# Patient Record
(demographics unavailable — no encounter records)

---

## 2018-12-29 NOTE — ERD
ER Documentation


Chief Complaint


Chief Complaint





CHEST WALL PAIN DUE TO MECHANICAL FALL 1 WEEK AGO





HPI


57-year-old male, with history of DM, coronary artery disease and hypertension, 


presents to the emergency department, complaining of left anterior chest wall 


pain after a mechanical fall that occurred approximately 1 week ago.  The 


patient states that the pain is sharp, 6/10, worsened by cough, palpation and 


deep inspiration.  The patient denies shortness of breath, no cough, no 


dizziness.





ROS


All systems reviewed and are negative except as per history of present illness.





Medications


Home Meds


Active Scripts


Hydrocodone/Acetaminophen (Norco 5-325 Tablet) 1 Each Tablet, 1 TAB PO Q6H PRN 


for PAIN, #7 TAB


   Prov:LUPE HUFF MD         18


Acetaminophen* (Tylenol*) 325 Mg Tablet, 2 TAB PO Q8 PRN for PAIN AND OR 


ELEVATED TEMP, #20 TAB


   Prov:LUPE HUFF MD         18


Cyclobenzaprine Hcl* (Cyclobenzaprine Hcl*) 10 Mg Tablet, 10 MG PO Q8 PRN for 


MUSCLE SPASMS, #20 TAB


   Prov:BILLY LANDON PA-C         3/19/16


Diclofenac Sodium* (Diclofenac Sodium*) 50 Mg Tablet.dr, 50 MG PO BID, #60 TAB


   Prov:BILLY LANDON PA-C         3/19/16


Naproxen* (Naprosyn*) 500 Mg Tablet, 500 MG PO BID PRN for PAIN AND/OR 


INFLAMMATION, #30 TAB


   Prov:LUIS ANGEL LOYD NP         6/24/15


Cyclobenzaprine Hcl* (Cyclobenzaprine Hcl*) 10 Mg Tablet, 5 MG PO TID, #30 TAB


   Prov:LUIS ANGEL LOYD NP         6/24/15


Reported Medications


Glucosamine Sulfate 2KCL (GLUCOSAMINE) 1,000 Mg Tablet, 1000 MG PO DAILY


   13


Multivitamin* (Daily Value*) 1 Each Tablet, 1 EACH PO DAILY


   13


Aspirin (Baby Aspirin) 81 Mg Tab.chew, 81 MG PO DAILY


   13


[Bp Med]   No Conflict Check


   13


Lovastatin (Lovastatin) 40 Mg Tablet, 40 MG PO DAILY


   13


Acetaminophen (Tylenol) 500 Mg Tab


   2/25/10


D-Methorphan Hb/Pe/Chlorphenir (Jacquie-Dm Syrup) 473 Ml Syrup


   2/25/10


Pioglitazone Hcl* (Actos*) 30 Mg Tablet


   2/25/10


Glyburide, Micro-Metformin Hcl (Glyburide-Metformin) 1 Tab Tablet


   2/25/10





Allergies


Allergies:  


Coded Allergies:  


     No Known Drug Allergy (Verified  Allergy, Mild, 6/23/15)





PMhx/Soc


History of Surgery:  Yes (BACK SURGERY , CARDIAC  CATHETER )


Hx Neurological Disorder:  No


Hx Respiratory Disorders:  No


Hx Cardiac Disorders:  Yes (CARDIAC HX/CHEST PAIN, HTN)


Hx Psychiatric Problems:  No


Hx Miscellaneous Medical Probl:  Yes (back surgery)


Hx Alcohol Use:  No


Hx Substance Use:  No


Hx Tobacco Use:  No


Smoking Status:  Never smoker





Physical Exam


Vitals





Vital Signs


  Date      Temp  Pulse  Resp  B/P (MAP)   Pulse Ox  O2          O2 Flow    FiO2


Time                                                 Delivery    Rate


  18  98.1    111    18      145/87        99


     12:13                          (106)





Physical Exam


Const:   No acute distress


Head:   Atraumatic 


Eyes:    Normal Conjunctiva


ENT:    Normal External Ears, Nose and Mouth.


Neck:               Full range of motion. No meningismus.


Resp:   Tenderness to palpation over the left anterior chest wall area, no gross


deformity.  Clear to auscultation bilaterally


Cardio:   Regular rate and rhythm, no murmurs


Abd:    Soft, non tender, non distended. Normal bowel sounds


Skin:   No petechiae or rashes


Back:   No midline or flank tenderness


Ext:    No cyanosis, or edema


Neur:   Awake and alert


Psych:    Normal Mood and Affect


Results 24 hrs





Laboratory Tests


                           Test
       18
14:03


                           Troponin I  < 0.012 ng/ml





DIAGNOSTIC IMAGING REPORT





Patient: BETZY GONSALEZ   : 1960   Age: 57  Sex: M                     


  


MR #:    [id number]   Acct #:   [id number]    DOS: 18 0000


Ordering MD: LUPE HUFF MD   Location:  [facility]   Room/Bed:            


                               





PROCEDURE:   CT Chest without contrast. 


 


CLINICAL INDICATION: Trauma


 


TECHNIQUE:   CT scan of the chest without contrast was performed on a 


multidetector high-resolution CT scanner.  Coronal and sagittal reformatted 


images were obtained from the axial source images. The total exam CTDI equals 16


mGy and the total exam DLP equals 644 mGy-cm.


 


DICOM images are available.


One or more of the following dose reduction techniques were utilized:


1.) Automated exposure control


2.) Adjustment of the mA +/- kV according to patient's size


3.) Use of iterative reconstruction technique.


 


COMPARISON:   Chest x-ray 2010


 


FINDINGS:


 


There is no lung contusion. No infiltrate, mass or nodule is visualized. There 


is no hemothorax, pneumothorax or pleural effusion. Thoracic aorta appears 


normal with no evidence of aneurysm or pseudoaneurysm. No mediastinal hemorrhage


is seen. There is no hilar or mediastinal adenopathy or mass. No chest wall 


contusion is seen.


 


There is fatty infiltration of the liver. There is no evidence of hepatic or 


splenic laceration or contusion. No upper abdominal mass is present. There is a 


2 cm cyst in the upper pole of the left kidney.


 


No fractures are noted.


 


IMPRESSION:


 


Normal chest CT. No lung contusion, hemothorax, pneumothorax or mediastinal 


hemorrhage. No fracture. Note chest wall contusion.


 


Fatty liver.


 


2 cm left renal cyst.


_____________________________________________ 


.Karlos Marshall MD, MD           Date    Time 


Electronically viewed and signed by .Karlos Marshall MD, MD on 2018 


13:55 


 


D:  2018 13:55  T:  2018 13:55


.A/





CC: LUPE HUFF MD





[phone]





Procedures/MDM


Differential diagnosis include but not limited to: Soft tissue contusion, 


sprain/strain, muscle spasm, fracture.  Your pulmonary exam grossly intact. 


Physical examination and clinical presentation consistent most likely with his 


wall contusion.


During the ED course the patient remained stable, without complaints.


Results and clinical impression discussed with patient who agrees with 


management. The patient is stable to be treated outpatient and will be 


discharged home with recommendations and close monitoring





The patient was instructed to follow up with the primary care provider in the 


next 48h.  If symptoms persist, worsen or new symptoms develop, then patient 


should return to the ED immediately.





Instructions explained and given to patient with acknowledgment and demonstrated


understanding.





Disclaimer: Inadvertent spelling and grammatical errors are likely due to 


EHR/dictation software use and do not reflect on the overall quality of patient 


care. Also, please note that the electronic time recorded on this note does not 


necessarily reflect the actual time of the patient encounter.





Departure


Diagnosis:  


   Primary Impression:  


   Chest wall contusion


Condition:  Stable


Patient Instructions:  Chest Wall Contusion





Additional Instructions:  


Muchas al por [facility] para casey servicio.





Esperamos que en casey visita a la margoth de emergencia casey problema medico haya sido 


solucionado y que se sienta mucho mejor. 





Para estar seguros que casey mejoria sigue en proceso, le pedimos el favor de hacer


josh paul de seguimiento medico con casey doctor primario en los proximos 2-4 brandon.





Lleve con usted estos documentos y las medicinas recetadas.





Si danyell sintomas empeoran, NO SE ESPERE, por favor regrese a margoth de emergencia 


INMEDIATAMENTE.





En bruna que usted no tenga un mdico de atencin primaria:


Llame al mdico o clnica comunitaria de referencia que aparece abajo surekha 


las horas de consultorio para hacer josh paul para que le vean.





CLINICAS:


[facility]  [phone] [address] [facility] [facility]  [phone] [address] [facility] [phone] [address] [facility]  [phone]


[address] [facility]. [place] [id number] [phone] [facility]. [phone] 


[address] LUPE JOYNER MD      Dec 29, 2018 12:57